# Patient Record
Sex: FEMALE | Race: WHITE
[De-identification: names, ages, dates, MRNs, and addresses within clinical notes are randomized per-mention and may not be internally consistent; named-entity substitution may affect disease eponyms.]

---
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
## 2025-01-21 ENCOUNTER — HOSPITAL ENCOUNTER (EMERGENCY)
Dept: HOSPITAL 33 - ED | Age: 20
Discharge: HOME | End: 2025-01-21
Payer: COMMERCIAL

## 2025-01-21 VITALS — SYSTOLIC BLOOD PRESSURE: 94 MMHG | OXYGEN SATURATION: 99 % | HEART RATE: 90 BPM | DIASTOLIC BLOOD PRESSURE: 58 MMHG

## 2025-01-21 VITALS — RESPIRATION RATE: 18 BRPM

## 2025-01-21 VITALS — TEMPERATURE: 97.5 F

## 2025-01-21 DIAGNOSIS — Z3A.01: ICD-10-CM

## 2025-01-21 DIAGNOSIS — N39.0: ICD-10-CM

## 2025-01-21 DIAGNOSIS — O23.41: ICD-10-CM

## 2025-01-21 DIAGNOSIS — O21.9: Primary | ICD-10-CM

## 2025-01-21 DIAGNOSIS — Z79.899: ICD-10-CM

## 2025-01-21 LAB
ALBUMIN SERPL-MCNC: 4.2 G/DL (ref 3.5–5)
ALP SERPL-CCNC: 88 U/L (ref 38–126)
ALT SERPL-CCNC: 15 U/L (ref 0–35)
AMYLASE SERPL-CCNC: 56 U/L (ref 30–110)
ANION GAP SERPL CALC-SCNC: 11.6 MEQ/L (ref 5–15)
AST SERPL QL: 32 U/L (ref 14–36)
BASOPHILS # BLD AUTO: 0.05 X10^3/UL (ref 0.01–0.08)
BASOPHILS NFR BLD AUTO: 0.3 % (ref 0.1–1.2)
BILIRUB BLD-MCNC: 0.2 MG/DL (ref 0.2–1.3)
BUN SERPL-MCNC: 10 MG/DL (ref 7–17)
CALCIUM SPEC-MCNC: 9.1 MG/DL (ref 8.4–10.2)
CHLORIDE SERPL-SCNC: 108 MMOL/L (ref 98–107)
CO2 SERPL-SCNC: 21 MMOL/L (ref 22–30)
CREAT SERPL-MCNC: 0.57 MG/DL (ref 0.52–1.04)
EOSINOPHIL # BLD AUTO: 0.03 X10^3/UL (ref 0.04–0.36)
FLUAV AG NPH QL IA: NEGATIVE
FLUBV AG NPH QL IA: NEGATIVE
GFR SERPLBLD BASED ON 1.73 SQ M-ARVRAT: 133.3 ML/MIN
GLUCOSE SERPL-MCNC: 142 MG/DL (ref 74–106)
HCT VFR BLD AUTO: 32.8 % (ref 34.1–44.9)
HGB BLD-MCNC: 10.9 G/DL (ref 11.2–15.7)
IMM GRANULOCYTES # BLD: 0.06 X10^3U/L (ref 0–0.03)
IMM GRANULOCYTES NFR BLD: 0.4 % (ref 0–0.43)
LIPASE SERPL-CCNC: 78 U/L (ref 23–300)
LYMPHOCYTES # SPEC AUTO: 1.72 X10^3/UL (ref 1.18–3.74)
MCH RBC QN AUTO: 28.9 PG (ref 25.6–32.2)
MCHC RBC AUTO-ENTMCNC: 33.2 G/DL (ref 32.2–35.5)
MONOCYTES # BLD AUTO: 0.26 X10^3/UL (ref 0.24–0.86)
NRBC # BLD AUTO: 0 X10^3U/L (ref 0–0.01)
NRBC BLD AUTO-RTO: 0 % (ref 0–0.2)
PLATELET # BLD AUTO: 295 X10^3/UL (ref 182–369)
POTASSIUM SERPLBLD-SCNC: 3.6 MMOL/L (ref 3.5–5.1)
PROT SERPL-MCNC: 7.3 G/DL (ref 6.3–8.2)
RBC # BLD AUTO: 3.77 X10^6/UL (ref 3.93–5.22)
RBC # URNS HPF: (no result) /HPF (ref 0–5)
RSV AG SPEC QL IA: NEGATIVE
SARS-COV-2 AG RESP QL IA.RAPID: NEGATIVE
SODIUM SERPL-SCNC: 136 MMOL/L (ref 135–145)
WBC # BLD AUTO: 14.7 X10^3/UL (ref 3.98–10.04)

## 2025-01-21 PROCEDURE — 87086 URINE CULTURE/COLONY COUNT: CPT

## 2025-01-21 PROCEDURE — 83690 ASSAY OF LIPASE: CPT

## 2025-01-21 PROCEDURE — 36415 COLL VENOUS BLD VENIPUNCTURE: CPT

## 2025-01-21 PROCEDURE — 96360 HYDRATION IV INFUSION INIT: CPT

## 2025-01-21 PROCEDURE — 82150 ASSAY OF AMYLASE: CPT

## 2025-01-21 PROCEDURE — 85025 COMPLETE CBC W/AUTO DIFF WBC: CPT

## 2025-01-21 PROCEDURE — 86308 HETEROPHILE ANTIBODY SCREEN: CPT

## 2025-01-21 PROCEDURE — 81001 URINALYSIS AUTO W/SCOPE: CPT

## 2025-01-21 PROCEDURE — 0241U: CPT

## 2025-01-21 PROCEDURE — 96374 THER/PROPH/DIAG INJ IV PUSH: CPT

## 2025-01-21 PROCEDURE — 80053 COMPREHEN METABOLIC PANEL: CPT

## 2025-01-21 PROCEDURE — 99284 EMERGENCY DEPT VISIT MOD MDM: CPT

## 2025-01-21 PROCEDURE — 99285 EMERGENCY DEPT VISIT HI MDM: CPT

## 2025-01-21 RX ADMIN — CEPHALEXIN ONE MG: 500 CAPSULE ORAL at 12:07

## 2025-01-21 RX ADMIN — ONDANSETRON ONE MG: 2 INJECTION, SOLUTION INTRAMUSCULAR; INTRAVENOUS at 09:05

## 2025-01-21 NOTE — ERPHSYRPT
- History of Present Illness


Time Seen by Provider: 01/21/25 08:22


Historian: patient, family


Exam Limitations: no limitations


Physician History: 





This is a 19-year-old white female patient who arrives by private vehicle and is

actively vomiting.  Her OB physician is Dr. Young and her primary care provider 

is nurse lula Yates.  The patient is approximately 6 weeks pregnant.  

Approximately 2 hours prior to arrival, the patient suddenly began having 

vomiting which she has not been having that frequently during this pregnancy.  

She denies vaginal bleeding.  She has no cough.  She has not had a fever.  She 

denies chest pain.  She does have a history of gastroesophageal reflux disease 

and esophagitis in the past.  She also has a history of depression.  Patient has

no significant abdominal pain.  She denies diarrhea.  Patient has antiemetics at

home.  However, because her symptoms came on so readily, she came directly here 

from work.


Timing/Duration: today


Activities at Onset: none


Abdominal Pain Onset Location: generalized abdomen


Severity of Pain-Max: mild


Severity of Pain-Current: none


Modifying Factors: Improves With: vomiting


Associated Symptoms: loss of appetite, nausea, vomiting


Previous symptoms: no prior history, no recent treatment


Allergies/Adverse Reactions: 








No Known Drug Allergies Allergy (Verified 10/13/22 12:04)


   





Home Medications: 








Buspirone HCl 5 mg*** [Buspar 5 mg***] 5 mg PO DAILY 01/21/25 [History]





Hx Tetanus, Diphtheria Vaccination/Date Given: Yes


Hx Influenza Vaccination/Date Given: No


Hx Pneumococcal Vaccination/Date Given: No





Travel Risk





- International Travel


Have you traveled outside of the country in past 3 weeks: No





- Emerging Infectious Disease


Are you exhibiting symptoms associated with any current EIDs: Yes


Symptoms: Vomitting





- Review of Systems


Constitutional: No Symptoms


Eyes: No Symptoms


Ears, Nose, & Throat: No Symptoms


Respiratory: No Symptoms


Cardiac: No Symptoms


Abdominal/Gastrointestinal: Abdominal Pain, Nausea, Vomiting, Appetite Changes


Genitourinary Symptoms: No Symptoms, Pregnancy


Musculoskeletal: No Symptoms


Skin: No Symptoms


Neurological: No Symptoms


Psychological: No Symptoms


Endocrine: No Symptoms


Hematologic/Lymphatic: No Symptoms


Immunological/Allergic: No Symptoms


All Other Systems: Reviewed and Negative





- Past Medical History


Pertinent Past Medical History: Yes


Neurological History: No Pertinent History


ENT History: No Pertinent History


Cardiac History: No Pertinent History


Respiratory History: No Pertinent History


Endocrine Medical History: No Pertinent History


Musculoskeletal History: No Pertinent History


GI Medical History: Colitis, GERD


 History: No Pertinent History


Psycho-Social History: Depression


Female Reproductive Disorders: No Pertinent History





- Past Surgical History


Past Surgical History: No





- Social History


Smoking Status: Never smoker


How long have you smoked: pt vapes


Exposure to second hand smoke: Yes


Drug Use: marijuana


Patient Lives Alone: No





- Nursing Vital Signs


Nursing Vital Signs: 


                               Initial Vital Signs











Temperature  97.5 F   01/21/25 08:22


 


Pulse Rate  103 H  01/21/25 08:22


 


Respiratory Rate  20   01/21/25 08:22


 


Blood Pressure  102/64   01/21/25 08:22


 


O2 Sat by Pulse Oximetry  100   01/21/25 08:22








                                   Pain Scale











Pain Intensity                 0

















- Physical Exam


General Appearance: no apparent distress, alert, anxiety


Eye Exam: PERRL/EOMI, eyes nml inspection


Ears, Nose, Throat Exam: normal ENT inspection, TMs normal, pharynx normal, 

moist mucous membranes


Neck Exam: normal inspection, non-tender, supple, full range of motion


Respiratory Exam: normal breath sounds, lungs clear, airway intact, No chest 

tenderness, No respiratory distress


Cardiovascular Exam: tachycardia (Mild)


Gastrointestinal/Abdomen Exam: soft, normal bowel sounds, No tenderness, No 

guarding


Pelvic Exam: not done


Rectal Exam: not done


Back Exam: normal inspection, normal range of motion, No CVA tenderness, No 

vertebral tenderness


Extremity Exam: normal inspection, normal range of motion, pelvis stable


Neurologic Exam: alert, oriented x 3, cooperative, CNs II-XII nml as tested, nml

 cerebellar function, nml station & gait, sensation nml


Skin Exam: normal color, warm, dry


Lymphatic Exam: No adenopathy


**SpO2 Interpretation**: normal


O2 Delivery: Room Air





- Course


Nursing assessment & vital signs reviewed: Yes


Ordered Tests: 


                               Active Orders 24 hr











 Category Date Time Status


 


 IV Insertion STAT Care  01/21/25 08:45 Active


 


 AMYLASE Stat Lab  01/21/25 09:07 Completed


 


 CBC W DIFF Stat Lab  01/21/25 09:07 Completed


 


 CMP Stat Lab  01/21/25 09:07 Completed


 


 CULTURE,URINE Stat Lab  01/21/25 11:32 Received


 


 LIPASE Stat Lab  01/21/25 09:07 Completed


 


 MONO SCREEN Stat Lab  01/21/25 09:07 Completed


 


 UA W/RFX UR CULTURE Stat Lab  01/21/25 11:32 Completed








Medication Summary














Discontinued Medications














Generic Name Dose Route Start Last Admin





  Trade Name Haroon  PRN Reason Stop Dose Admin


 


Sodium Chloride  1,000 mls @ 999 mls/hr  01/21/25 08:45  01/21/25 10:12





  Sodium Chloride 0.9% 1000 Ml  IV  01/21/25 09:45  Infused





  .Q1H1M STA   Infusion


 


Sodium Chloride  Confirm  01/21/25 09:03 





  Sodium Chloride 0.9% 1000 Ml  Administered  01/21/25 09:04 





  Dose  





  1,000 mls @ ud  





  .ROUTE  





  .STK-MED ONE  


 


Sodium Chloride  500 mls @ 500 mls/hr  01/21/25 10:27  01/21/25 11:54





  Sodium Chloride 0.9% 500 Ml  IV  01/21/25 11:26  Infused





  .Q1H ONE   Infusion


 


Sodium Chloride  Confirm  01/21/25 10:32 





  Sodium Chloride 0.9% 500 Ml  Administered  01/21/25 10:33 





  Dose  





  500 mls @ ud  





  IV  





  .STK-MED ONE  


 


Ondansetron HCl  4 mg  01/21/25 08:45  01/21/25 09:05





  Ondansetron Hcl 4 Mg/2 Ml Vial  IV  01/21/25 08:46  4 mg





  STAT ONE   Administration


 


Ondansetron HCl  Confirm  01/21/25 09:03 





  Ondansetron Hcl 4 Mg/2 Ml Vial  Administered  01/21/25 09:04 





  Dose  





  4 mg  





  .ROUTE  





  .STK-MED ONE  











Lab/Rad Data: 


                           Laboratory Result Diagrams





                                 01/21/25 09:07 





                                 01/21/25 09:07 





                               Laboratory Results











  01/21/25 01/21/25 01/21/25 Range/Units





  11:32 09:07 09:07 


 


WBC     (3.98-10.04)  x10^3/uL


 


RBC     (3.93-5.22)  x10^6/uL


 


Hgb     (11.2-15.7)  g/dL


 


Hct     (34.1-44.9)  %


 


MCV     (79.4-94.8)  fL


 


MCH     (25.6-32.2)  pg


 


MCHC     (32.2-35.5)  g/dL


 


RDW     (11.7-14.4)  %


 


Plt Count     (182-369)  x10^3/uL


 


MPV     (9.4-12.3)  fL


 


Gran %     (34.0-71.1)  %


 


Immature Gran % (Auto)     (0.001-0.429)  %


 


Nucleat RBC Rel Count     (0.00-0.2)  %


 


Eos # (Auto)     (0.04-0.36)  x10^3/uL


 


Immature Gran # (Auto)     (0.001-0.031)  x10^3u/L


 


Absolute Lymphs (auto)     (1.18-3.74)  x10^3/uL


 


Absolute Monos (auto)     (0.24-0.86)  x10^3/uL


 


Absolute Nucleated RBC     (0.00-0.012)  x10^3u/L


 


Lymphocytes %     (19.3-51.7)  %


 


Monocytes %     (4.7-12.5)  %


 


Eosinophils %     (0.7-5.8)  %


 


Basophils %     (0.1-1.2)  %


 


Absolute Granulocytes     (1.56-6.13)  x10^3/uL


 


Basophils #     (0.01-0.08)  x10^3/uL


 


Sodium     (135-145)  mmol/L


 


Potassium     (3.5-5.1)  mmol/L


 


Chloride     ()  mmol/L


 


Carbon Dioxide     (22-30)  mmol/L


 


Anion Gap     (5-15)  MEQ/L


 


BUN     (7-17)  mg/dL


 


Creatinine     (0.52-1.04)  mg/dL


 


Estimated GFR     ML/MIN


 


Glucose     ()  mg/dL


 


Calcium     (8.4-10.2)  mg/dL


 


Total Bilirubin     (0.2-1.3)  mg/dL


 


AST     (14-36)  U/L


 


ALT     (0-35)  U/L


 


Alkaline Phosphatase     ()  U/L


 


Serum Total Protein     (6.3-8.2)  g/dL


 


Albumin     (3.5-5.0)  g/dL


 


Amylase     ()  U/L


 


Lipase     ()  U/L


 


Urine Color  Yellow    (Yellow)  


 


Urine Appearance  Turbid A    (Clear)  


 


Urine pH  7.5    (4.6-8.0)  


 


Ur Specific Gravity  1.015    (1.005-1.030)  


 


Urine Protein  Negative    (Negative)  


 


Urine Glucose (UA)  Negative    (Negative)  mg/dL


 


Urine Ketones  Trace A    (Negative)  


 


Urine Blood  Negative    (Negative)  


 


Urine Nitrite  Negative    (Negative)  


 


Urine Bilirubin  Negative    (Negative)  


 


Urine Urobilinogen  0.2    (0.2)  mg/dL


 


Ur Leukocyte Esterase  Small A    (Negative)  


 


U Hyaline Cast (Auto)  NONE SEEN    (0-2)  /LPF


 


Urine Microscopic RBC  0-2    (0-5)  /HPF


 


Urine Microscopic WBC  11-20 A    (0-5)  /HPF


 


Ur Epithelial Cells  Few    (None Seen)  /HPF


 


Urine Bacteria  Moderate A    (None Seen)  /HPF


 


Urine Culture Reflexed  YES    (NO)  


 


Monoscreen    NEGATIVE  (NEGATIVE)  


 


Influenza Type A Ag   NEGATIVE   (NEGATIVE)  


 


Influenza Type B Ag   NEGATIVE   (NEGATIVE)  


 


RSV (PCR)   NEGATIVE   (NEGATIVE)  


 


SARS-CoV-2 (PCR)   NEGATIVE   (NEGATIVE)  














  01/21/25 01/21/25 Range/Units





  09:07 09:07 


 


WBC   14.7 H  (3.98-10.04)  x10^3/uL


 


RBC   3.77 L  (3.93-5.22)  x10^6/uL


 


Hgb   10.9 L  (11.2-15.7)  g/dL


 


Hct   32.8 L  (34.1-44.9)  %


 


MCV   87.0  (79.4-94.8)  fL


 


MCH   28.9  (25.6-32.2)  pg


 


MCHC   33.2  (32.2-35.5)  g/dL


 


RDW   12.8  (11.7-14.4)  %


 


Plt Count   295  (182-369)  x10^3/uL


 


MPV   9.2 L  (9.4-12.3)  fL


 


Gran %   85.6 H  (34.0-71.1)  %


 


Immature Gran % (Auto)   0.4  (0.001-0.429)  %


 


Nucleat RBC Rel Count   0.0  (0.00-0.2)  %


 


Eos # (Auto)   0.03 L  (0.04-0.36)  x10^3/uL


 


Immature Gran # (Auto)   0.06 H  (0.001-0.031)  x10^3u/L


 


Absolute Lymphs (auto)   1.72  (1.18-3.74)  x10^3/uL


 


Absolute Monos (auto)   0.26  (0.24-0.86)  x10^3/uL


 


Absolute Nucleated RBC   0.00  (0.00-0.012)  x10^3u/L


 


Lymphocytes %   11.7 L  (19.3-51.7)  %


 


Monocytes %   1.8 L  (4.7-12.5)  %


 


Eosinophils %   0.2 L  (0.7-5.8)  %


 


Basophils %   0.3  (0.1-1.2)  %


 


Absolute Granulocytes   12.61 H  (1.56-6.13)  x10^3/uL


 


Basophils #   0.05  (0.01-0.08)  x10^3/uL


 


Sodium  136   (135-145)  mmol/L


 


Potassium  3.6   (3.5-5.1)  mmol/L


 


Chloride  108 H   ()  mmol/L


 


Carbon Dioxide  21 L   (22-30)  mmol/L


 


Anion Gap  11.6   (5-15)  MEQ/L


 


BUN  10   (7-17)  mg/dL


 


Creatinine  0.57   (0.52-1.04)  mg/dL


 


Estimated GFR  133.3   ML/MIN


 


Glucose  142 H   ()  mg/dL


 


Calcium  9.1   (8.4-10.2)  mg/dL


 


Total Bilirubin  0.20   (0.2-1.3)  mg/dL


 


AST  32   (14-36)  U/L


 


ALT  15   (0-35)  U/L


 


Alkaline Phosphatase  88   ()  U/L


 


Serum Total Protein  7.3   (6.3-8.2)  g/dL


 


Albumin  4.2   (3.5-5.0)  g/dL


 


Amylase  56   ()  U/L


 


Lipase  78   ()  U/L


 


Urine Color    (Yellow)  


 


Urine Appearance    (Clear)  


 


Urine pH    (4.6-8.0)  


 


Ur Specific Gravity    (1.005-1.030)  


 


Urine Protein    (Negative)  


 


Urine Glucose (UA)    (Negative)  mg/dL


 


Urine Ketones    (Negative)  


 


Urine Blood    (Negative)  


 


Urine Nitrite    (Negative)  


 


Urine Bilirubin    (Negative)  


 


Urine Urobilinogen    (0.2)  mg/dL


 


Ur Leukocyte Esterase    (Negative)  


 


U Hyaline Cast (Auto)    (0-2)  /LPF


 


Urine Microscopic RBC    (0-5)  /HPF


 


Urine Microscopic WBC    (0-5)  /HPF


 


Ur Epithelial Cells    (None Seen)  /HPF


 


Urine Bacteria    (None Seen)  /HPF


 


Urine Culture Reflexed    (NO)  


 


Monoscreen    (NEGATIVE)  


 


Influenza Type A Ag    (NEGATIVE)  


 


Influenza Type B Ag    (NEGATIVE)  


 


RSV (PCR)    (NEGATIVE)  


 


SARS-CoV-2 (PCR)    (NEGATIVE)  














- Progress


Progress: improved


Progress Note: 





01/21/25 08:49


My medical decision making and the assignment of moderate complexity to this 

patient's medical issue today is based on review of the patient's past medical 

history, review the patient's medication list, reviewed patient drug allergy 

list, history present illness and physical findings on examination.  The workup 

in this patient includes placement of intravenous line, infusion of normal 

saline solution, infusion of Zofran intravenously, CBC, CMP, amylase, lipase, 

urinalysis.  We will also draw viral studies and monotest.





Differential diagnosis includes but is not limited to viral illness, dehydratio

n, urinary tract infection, vomiting of pregnancy


01/21/25 10:59


I interpreted the lab results that have returned.  We are still awaiting for 

urine specimen to run the urinalysis.  Patient has a leukocytosis with a left 

shift.  This may be reactive to the multiple times she vomited.  Her flu swabs m

onotest and strep are all negative.


01/21/25 12:02


I interpreted the urinalysis.  The urinalysis shows a UTI


Counseled pt/family regarding: lab results, diagnosis, need for follow-up





Medical Desision Making





- Independent Historian


Additional History obtained from: Spouse





- Diagnostic Testing


Diagnostic test were ordered, analyzed, and reviewed by me: Yes





- Risk of complications


The pt has a mod risk of morbidity or mortality based on: Need for prescription 

drug management





- Departure


Departure Disposition: Home


Clinical Impression: 


 UTI (urinary tract infection) during pregnancy, Vomiting





Condition: Stable


Critical Care Time: No


Referrals: 


AL YATES NP [Primary Care Provider] - Follow up/PCP as directed


Additional Instructions: 


Drink plenty of clear liquids before advancing your diet.  Take your antibiotics

as prescribed.  Call your obstetrician and primary care provider today, 

1/21/2025 to make arrangements for follow-up appointment for further evaluation 

and management.


Prescriptions: 


Cephalexin Mh 500 mg** [Keflex 500 mg**] 500 mg PO TID #15 cap

## 2025-05-21 ENCOUNTER — HOSPITAL ENCOUNTER (OUTPATIENT)
Dept: HOSPITAL 33 - OB | Age: 20
Setting detail: OBSERVATION
Discharge: HOME | End: 2025-05-21
Attending: OBSTETRICS & GYNECOLOGY | Admitting: OBSTETRICS & GYNECOLOGY
Payer: COMMERCIAL

## 2025-05-21 VITALS
RESPIRATION RATE: 18 BRPM | SYSTOLIC BLOOD PRESSURE: 120 MMHG | DIASTOLIC BLOOD PRESSURE: 74 MMHG | TEMPERATURE: 98.6 F | OXYGEN SATURATION: 98 % | HEART RATE: 135 BPM

## 2025-05-21 DIAGNOSIS — Z34.03: Primary | ICD-10-CM

## 2025-05-21 DIAGNOSIS — Z3A.33: ICD-10-CM

## 2025-05-21 LAB
A1 MICROGLOB PLACENTAL VAG QL: NEGATIVE
AMPHET UR QL: NEGATIVE
BARBITURATES UR QL: NEGATIVE
BENZODIAZ UR QL SCN: NEGATIVE
COCAINE UR QL SCN: NEGATIVE
METHADONE UR QL: NEGATIVE
OPIATES UR QL: NEGATIVE
PCP UR QL CFM>20 NG/ML: NEGATIVE
RBC # URNS HPF: (no result) /HPF (ref 0–5)
THC UR QL SCN: NEGATIVE
WBC URNS QL MICRO: (no result) /HPF (ref 0–5)

## 2025-05-21 PROCEDURE — 81001 URINALYSIS AUTO W/SCOPE: CPT

## 2025-05-21 PROCEDURE — 84112 EVAL AMNIOTIC FLUID PROTEIN: CPT

## 2025-05-21 PROCEDURE — 80307 DRUG TEST PRSMV CHEM ANLYZR: CPT
